# Patient Record
Sex: MALE | Race: WHITE | NOT HISPANIC OR LATINO | URBAN - METROPOLITAN AREA
[De-identification: names, ages, dates, MRNs, and addresses within clinical notes are randomized per-mention and may not be internally consistent; named-entity substitution may affect disease eponyms.]

---

## 2018-09-04 ENCOUNTER — APPOINTMENT (OUTPATIENT)
Dept: URBAN - METROPOLITAN AREA CLINIC 200 | Age: 33
Setting detail: DERMATOLOGY
End: 2018-09-07

## 2018-09-04 DIAGNOSIS — B35.3 TINEA PEDIS: ICD-10-CM

## 2018-09-04 DIAGNOSIS — L57.8 OTHER SKIN CHANGES DUE TO CHRONIC EXPOSURE TO NONIONIZING RADIATION: ICD-10-CM

## 2018-09-04 DIAGNOSIS — Z80.8 FAMILY HISTORY OF MALIGNANT NEOPLASM OF OTHER ORGANS OR SYSTEMS: ICD-10-CM

## 2018-09-04 DIAGNOSIS — L71.8 OTHER ROSACEA: ICD-10-CM

## 2018-09-04 PROBLEM — D23.5 OTHER BENIGN NEOPLASM OF SKIN OF TRUNK: Status: ACTIVE | Noted: 2018-09-04

## 2018-09-04 PROBLEM — L20.84 INTRINSIC (ALLERGIC) ECZEMA: Status: ACTIVE | Noted: 2018-09-04

## 2018-09-04 PROCEDURE — OTHER COUNSELING: OTHER

## 2018-09-04 PROCEDURE — 99203 OFFICE O/P NEW LOW 30 MIN: CPT

## 2018-09-04 PROCEDURE — OTHER PRESCRIPTION: OTHER

## 2018-09-04 PROCEDURE — OTHER RECOMMENDATIONS: OTHER

## 2018-09-04 RX ORDER — METRONIDAZOLE 7.5 MG/G
GEL TOPICAL
Qty: 1 | Refills: 6 | Status: ERX | COMMUNITY
Start: 2018-09-04

## 2018-09-04 RX ORDER — DOXYCYCLINE HYCLATE 20 MG/1
TABLET, FILM COATED ORAL
Qty: 60 | Refills: 5 | Status: ERX | COMMUNITY
Start: 2018-09-04

## 2018-09-04 ASSESSMENT — LOCATION DETAILED DESCRIPTION DERM
LOCATION DETAILED: LEFT LATERAL PLANTAR HEEL
LOCATION DETAILED: LEFT INFERIOR ANTERIOR NECK
LOCATION DETAILED: LEFT MEDIAL SUPERIOR CHEST
LOCATION DETAILED: RIGHT INFERIOR CENTRAL MALAR CHEEK
LOCATION DETAILED: RIGHT MEDIAL PLANTAR MIDFOOT
LOCATION DETAILED: RIGHT PLANTAR FOREFOOT OVERLYING 1ST METATARSAL
LOCATION DETAILED: LEFT INFERIOR CENTRAL MALAR CHEEK

## 2018-09-04 ASSESSMENT — LOCATION ZONE DERM
LOCATION ZONE: FACE
LOCATION ZONE: FEET
LOCATION ZONE: NECK
LOCATION ZONE: TRUNK

## 2018-09-04 ASSESSMENT — LOCATION SIMPLE DESCRIPTION DERM
LOCATION SIMPLE: RIGHT CHEEK
LOCATION SIMPLE: RIGHT PLANTAR SURFACE
LOCATION SIMPLE: LEFT PLANTAR SURFACE
LOCATION SIMPLE: LEFT ANTERIOR NECK
LOCATION SIMPLE: LEFT CHEEK
LOCATION SIMPLE: CHEST

## 2018-09-04 NOTE — PROCEDURE: RECOMMENDATIONS
Recommendations (Free Text): OTC anti fungus medication
Detail Level: Simple
Recommendation Preamble: The following recommendations were made during the visit:

## 2019-01-23 ENCOUNTER — OFFICE VISIT (OUTPATIENT)
Dept: FAMILY MEDICINE | Facility: CLINIC | Age: 34
End: 2019-01-23
Payer: COMMERCIAL

## 2019-01-23 VITALS
SYSTOLIC BLOOD PRESSURE: 112 MMHG | WEIGHT: 202 LBS | HEIGHT: 71 IN | HEART RATE: 60 BPM | DIASTOLIC BLOOD PRESSURE: 70 MMHG | RESPIRATION RATE: 12 BRPM | TEMPERATURE: 97.8 F | BODY MASS INDEX: 28.28 KG/M2

## 2019-01-23 DIAGNOSIS — Z00.00 ENCOUNTER FOR GENERAL ADULT MEDICAL EXAMINATION WITHOUT ABNORMAL FINDINGS: Primary | ICD-10-CM

## 2019-01-23 DIAGNOSIS — E78.00 PURE HYPERCHOLESTEROLEMIA: ICD-10-CM

## 2019-01-23 LAB
BILIRUBIN, POC: NEGATIVE
BLOOD URINE, POC: NEGATIVE
GLUCOSE URINE, POC: NEGATIVE
KETONES, POC: NEGATIVE
LEUKOCYTE EST, POC: NEGATIVE
NITRITE, POC: NEGATIVE
PH, POC: 6
PROTEIN, POC: NEGATIVE
SPECIFIC GRAVITY, POC: 1.03
UROBILINOGEN, POC: 4

## 2019-01-23 PROCEDURE — 90686 IIV4 VACC NO PRSV 0.5 ML IM: CPT | Performed by: FAMILY MEDICINE

## 2019-01-23 PROCEDURE — 99395 PREV VISIT EST AGE 18-39: CPT | Mod: 25 | Performed by: FAMILY MEDICINE

## 2019-01-23 PROCEDURE — 81003 URINALYSIS AUTO W/O SCOPE: CPT | Performed by: FAMILY MEDICINE

## 2019-01-23 PROCEDURE — 90471 IMMUNIZATION ADMIN: CPT | Performed by: FAMILY MEDICINE

## 2019-01-23 ASSESSMENT — ENCOUNTER SYMPTOMS
DIZZINESS: 0
NAUSEA: 0
WEAKNESS: 0
SHORTNESS OF BREATH: 0
VOICE CHANGE: 0
ADENOPATHY: 0
BRUISES/BLEEDS EASILY: 0
VOMITING: 0
CONSTITUTIONAL NEGATIVE: 1
COUGH: 0
TREMORS: 0
BACK PAIN: 0
FREQUENCY: 0
DIARRHEA: 0
TROUBLE SWALLOWING: 0
ABDOMINAL PAIN: 0
CHEST TIGHTNESS: 0
PALPITATIONS: 0
CONFUSION: 0
CONSTIPATION: 0
ARTHRALGIAS: 0
SLEEP DISTURBANCE: 0
HEADACHES: 0
NECK PAIN: 0
DYSURIA: 0

## 2019-01-23 NOTE — PATIENT INSTRUCTIONS
Problem List Items Addressed This Visit        Other    Pure hypercholesterolemia     Cholesterol/Hyperlipidemia:  1. Patient agrees to follow a low fat diet.  2. Patient will engage in a regular exercise, if physically able to exercise.  3. Patient will take medications for his cholesterol, when they are prescribed.           Other Visit Diagnoses     Encounter for general adult medical examination without abnormal findings    -  Primary    Relevant Orders    CBC    Comprehensive metabolic panel    Lipid panel    POCT urinalysis, automated, w/o scope    BMI 28.0-28.9,adult        Do not gain any weight.  And after March hopefully we can lose some more weight.      Patient had a hooping cough vaccination in 2009.  The CDC recommends all adults only need one hooping cough vaccination.  Pregnant women will get in cough vaccinations prior to their delivery.  Patient's wife is currently pregnant by the CDC recommendation he does not need another 14 cough vaccination.  However, if there is some concern by his spouse.  We will give him another vaccination.

## 2019-01-23 NOTE — ASSESSMENT & PLAN NOTE
Cholesterol/Hyperlipidemia:  1. Patient agrees to follow a low fat diet.  2. Patient will engage in a regular exercise, if physically able to exercise.  3. Patient will take medications for his cholesterol, when they are prescribed.

## 2019-01-23 NOTE — PROGRESS NOTES
"   Patient ID: Nixon Moscoso is a 33 y.o. male.        Subjective     Patient is a 33-year-old male who is here for his annual physical examination.  He was last seen about 2 years ago.  Patient has no new concerns.          The following have been reviewed and updated as appropriate in this visit:  Tobacco  Allergies  Problems  Fam Hx       Patient Active Problem List   Diagnosis   • Pure hypercholesterolemia   • Allergic rhinitis     No current outpatient prescriptions on file.    Allergies   Allergen Reactions   • No Known Allergies      Review of Systems   Constitutional: Negative.    HENT: Negative for ear pain, hearing loss, nosebleeds, trouble swallowing and voice change.    Eyes: Negative for visual disturbance.   Respiratory: Negative for cough, chest tightness and shortness of breath.    Cardiovascular: Negative for chest pain, palpitations and leg swelling.   Gastrointestinal: Negative for abdominal pain, constipation, diarrhea, nausea and vomiting.   Endocrine: Negative for cold intolerance and heat intolerance.   Genitourinary: Negative for decreased urine volume, discharge, dysuria, frequency and testicular pain.   Musculoskeletal: Negative for arthralgias, back pain and neck pain.   Skin: Negative for rash.   Allergic/Immunologic: Negative for food allergies.   Neurological: Negative for dizziness, tremors, syncope, weakness and headaches.   Hematological: Negative for adenopathy. Does not bruise/bleed easily.   Psychiatric/Behavioral: Negative for behavioral problems, confusion, sleep disturbance and suicidal ideas.         Objective     Vitals:    01/23/19 0802 01/23/19 0820   BP: 108/72 112/70   Pulse: 60    Resp: 12    Temp: 36.6 °C (97.8 °F)    Weight: 91.6 kg (202 lb)    Height: 1.803 m (5' 11\")      Physical Exam   Constitutional: He is oriented to person, place, and time. He appears well-developed and well-nourished.   HENT:   Head: Normocephalic.   Right Ear: External ear normal. "   Left Ear: External ear normal.   Nose: Nose normal.   Mouth/Throat: Oropharynx is clear and moist.   Eyes: Conjunctivae and EOM are normal. Pupils are equal, round, and reactive to light. No scleral icterus.   Neck: Normal range of motion. Neck supple. No thyromegaly present.   Cardiovascular: Normal rate, regular rhythm, normal heart sounds and intact distal pulses.    Pulmonary/Chest: Effort normal and breath sounds normal. No respiratory distress. He has no wheezes. He has no rales. He exhibits no tenderness.   Abdominal: Soft. Bowel sounds are normal. He exhibits no distension and no mass. There is no tenderness. There is no rebound and no guarding. No hernia.   Genitourinary: Penis normal.   Musculoskeletal: He exhibits no edema, tenderness or deformity.   Lymphadenopathy:     He has no cervical adenopathy.   Neurological: He is alert and oriented to person, place, and time. He displays normal reflexes. No cranial nerve deficit. Coordination normal.   Skin: Skin is warm. Capillary refill takes less than 2 seconds. No rash noted. No pallor.   Psychiatric: He has a normal mood and affect. His behavior is normal. Judgment normal.   Nursing note and vitals reviewed.      Assessment/Plan       Problem List Items Addressed This Visit        Other    Pure hypercholesterolemia     Cholesterol/Hyperlipidemia:  1. Patient agrees to follow a low fat diet.  2. Patient will engage in a regular exercise, if physically able to exercise.  3. Patient will take medications for his cholesterol, when they are prescribed.           Other Visit Diagnoses     Encounter for general adult medical examination without abnormal findings    -  Primary    Relevant Orders    CBC    Comprehensive metabolic panel    Lipid panel    POCT urinalysis, automated, w/o scope    BMI 28.0-28.9,adult        Do not gain any weight.  And after March hopefully we can lose some more weight.      Patient had a hooping cough vaccination in 2009.  The CDC  recommends all adults only need one hooping cough vaccination.  Pregnant women will get in cough vaccinations prior to their delivery.  Patient's wife is currently pregnant by the CDC recommendation he does not need another 14 cough vaccination.  However, if there is some concern by his spouse.  We will give him another vaccination.

## 2019-01-24 LAB
ALBUMIN SERPL-MCNC: 4.8 G/DL (ref 3.5–5.5)
ALBUMIN/GLOB SERPL: 1.7 {RATIO} (ref 1.2–2.2)
ALP SERPL-CCNC: 60 IU/L (ref 39–117)
ALT SERPL-CCNC: 18 IU/L (ref 0–44)
AST SERPL-CCNC: 26 IU/L (ref 0–40)
BILIRUB SERPL-MCNC: 0.6 MG/DL (ref 0–1.2)
BUN SERPL-MCNC: 17 MG/DL (ref 6–20)
BUN/CREAT SERPL: 16 (ref 9–20)
CALCIUM SERPL-MCNC: 9.5 MG/DL (ref 8.7–10.2)
CHLORIDE SERPL-SCNC: 97 MMOL/L (ref 96–106)
CHOLEST SERPL-MCNC: 189 MG/DL (ref 100–199)
CO2 SERPL-SCNC: 27 MMOL/L (ref 20–29)
CREAT SERPL-MCNC: 1.05 MG/DL (ref 0.76–1.27)
ERYTHROCYTE [DISTWIDTH] IN BLOOD BY AUTOMATED COUNT: 13.8 % (ref 12.3–15.4)
GLOBULIN SER CALC-MCNC: 2.8 G/DL (ref 1.5–4.5)
GLUCOSE SERPL-MCNC: 96 MG/DL (ref 65–99)
HCT VFR BLD AUTO: 44.6 % (ref 37.5–51)
HDLC SERPL-MCNC: 55 MG/DL
HGB BLD-MCNC: 15.2 G/DL (ref 13–17.7)
LAB CORP EGFR IF AFRICN AM: 109 ML/MIN/1.73
LAB CORP EGFR IF NONAFRICN AM: 94 ML/MIN/1.73
LDLC SERPL CALC-MCNC: 111 MG/DL (ref 0–99)
MCH RBC QN AUTO: 28.4 PG (ref 26.6–33)
MCHC RBC AUTO-ENTMCNC: 34.1 G/DL (ref 31.5–35.7)
MCV RBC AUTO: 83 FL (ref 79–97)
PLATELET # BLD AUTO: 253 X10E3/UL (ref 150–379)
POTASSIUM SERPL-SCNC: 4.5 MMOL/L (ref 3.5–5.2)
PROT SERPL-MCNC: 7.6 G/DL (ref 6–8.5)
RBC # BLD AUTO: 5.36 X10E6/UL (ref 4.14–5.8)
SODIUM SERPL-SCNC: 139 MMOL/L (ref 134–144)
TRIGL SERPL-MCNC: 117 MG/DL (ref 0–149)
VLDLC SERPL CALC-MCNC: 23 MG/DL (ref 5–40)
WBC # BLD AUTO: 7.6 X10E3/UL (ref 3.4–10.8)

## 2019-01-26 ENCOUNTER — TELEPHONE (OUTPATIENT)
Dept: FAMILY MEDICINE | Facility: CLINIC | Age: 34
End: 2019-01-26

## 2019-01-26 NOTE — TELEPHONE ENCOUNTER
Nixon,  Enclosed a copy of your most recent lab work.  The only abnormality was a mild elevation in your LDL/bad cholesterol.  Optimal LDL is under 100.  I encourage you to eat healthy low-fat/Mediterranean diet, exercise and maintain an ideal body weight.  Recommend checking in 1 year.

## 2019-01-26 NOTE — LETTER
January 26, 2019     Nixon Moscoso  [address] [place] [id number]      Dear Nixon PONCE:    Below are the results from your recent visit:    Resulted Orders   CBC   Result Value Ref Range    WBC 7.6 3.4 - 10.8 x10E3/uL    RBC 5.36 4.14 - 5.80 x10E6/uL    Hemoglobin 15.2 13.0 - 17.7 g/dL    Hematocrit 44.6 37.5 - 51.0 %    MCV 83 79 - 97 fL    MCH 28.4 26.6 - 33.0 pg    MCHC 34.1 31.5 - 35.7 g/dL    RDW 13.8 12.3 - 15.4 %    Platelets 253 150 - 379 x10E3/uL    Narrative    Performed at:  01 - [facility]  [id number]  : Araceli B Reyes MD, Phone:  [phone]   Comprehensive metabolic panel   Result Value Ref Range    Glucose, Serum 96 65 - 99 mg/dL    BUN 17 6 - 20 mg/dL    Creatinine, Serum 1.05 0.76 - 1.27 mg/dL    eGFR If NonAfricn Am 94 >59 mL/min/1.73    eGFR If Africn Am 109 >59 mL/min/1.73    BUN/Creatinine Ratio 16 9 - 20    Sodium, Serum 139 134 - 144 mmol/L    Potassium, Serum 4.5 3.5 - 5.2 mmol/L    Chloride, Serum 97 96 - 106 mmol/L    Carbon Dioxide, Total 27 20 - 29 mmol/L    Calcium, Serum 9.5 8.7 - 10.2 mg/dL    Protein, Total, Serum 7.6 6.0 - 8.5 g/dL    Albumin, Serum 4.8 3.5 - 5.5 g/dL    Globulin, Total 2.8 1.5 - 4.5 g/dL    A/G Ratio 1.7 1.2 - 2.2    Bilirubin, Total 0.6 0.0 - 1.2 mg/dL    Alkaline Phosphatase, S 60 39 - 117 IU/L    AST (SGOT) 26 0 - 40 IU/L    ALT (SGPT) 18 0 - 44 IU/L    Narrative    Performed at:  01 - [facility]  [id number]  : Araceli B Reyes MD, Phone:  [phone]   Lipid panel   Result Value Ref Range    Cholesterol, Total 189 100 - 199 mg/dL    Triglycerides 117 0 - 149 mg/dL    HDL Cholesterol 55 >39 mg/dL    VLDL Cholesterol Cecilio 23 5 - 40 mg/dL    LDL Cholesterol Calc 111 (H) 0 - 99 mg/dL    Narrative    Performed at:  01 - [address]  [id number]  : Araceli B Reyes MD, Phone:  [phone]   POCT urinalysis, automated, w/o scope    Result Value Ref Range    Leukocytes, UA Negative     Nitrite, UA Negative     Urobilinogen, UA 4.0     Protein, UA Negative     pH, UA 6.0     Blood, UA Negative     Spec Grav, UA 1.030     Ketones, UA Negative     Bilirubin, UA Negative     Glucose, UA Negative        The test results show abnormal values which need to be addressed.  In particular, the results for LDL/bad cholesterol are significant.    Nixon,  Enclosed a copy of your most recent lab work.  The only abnormality was a mild elevation in your LDL/bad cholesterol.  Optimal LDL is under 100.  I encourage you to eat healthy low-fat/Mediterranean diet, exercise and maintain an ideal body weight.  Recommend checking in 1 year.    If you have any questions or concerns, please don't hesitate to call.    Sincerely,        Merlin Magana MD

## 2020-01-13 ENCOUNTER — APPOINTMENT (OUTPATIENT)
Dept: URBAN - METROPOLITAN AREA CLINIC 200 | Age: 35
Setting detail: DERMATOLOGY
End: 2020-01-27

## 2020-01-13 ENCOUNTER — RX ONLY (RX ONLY)
Age: 35
End: 2020-01-13

## 2020-01-13 DIAGNOSIS — Z80.8 FAMILY HISTORY OF MALIGNANT NEOPLASM OF OTHER ORGANS OR SYSTEMS: ICD-10-CM

## 2020-01-13 DIAGNOSIS — L71.8 OTHER ROSACEA: ICD-10-CM

## 2020-01-13 DIAGNOSIS — L57.8 OTHER SKIN CHANGES DUE TO CHRONIC EXPOSURE TO NONIONIZING RADIATION: ICD-10-CM

## 2020-01-13 DIAGNOSIS — Z12.83 ENCOUNTER FOR SCREENING FOR MALIGNANT NEOPLASM OF SKIN: ICD-10-CM

## 2020-01-13 PROCEDURE — OTHER COUNSELING: OTHER

## 2020-01-13 PROCEDURE — OTHER MIPS QUALITY: OTHER

## 2020-01-13 PROCEDURE — 99213 OFFICE O/P EST LOW 20 MIN: CPT

## 2020-01-13 PROCEDURE — OTHER REASSURANCE: OTHER

## 2020-01-13 PROCEDURE — OTHER PRESCRIPTION: OTHER

## 2020-01-13 PROCEDURE — OTHER PRESCRIPTION MEDICATION MANAGEMENT: OTHER

## 2020-01-13 RX ORDER — AZELAIC ACID 0.15 G/G
GEL TOPICAL
Qty: 1 | Refills: 6 | Status: CANCELLED
Stop reason: DRUGHIGH

## 2020-01-13 ASSESSMENT — LOCATION DETAILED DESCRIPTION DERM
LOCATION DETAILED: LEFT CENTRAL MALAR CHEEK
LOCATION DETAILED: LEFT MEDIAL SUPERIOR CHEST
LOCATION DETAILED: RIGHT SUPERIOR MEDIAL UPPER BACK
LOCATION DETAILED: RIGHT LATERAL MALAR CHEEK
LOCATION DETAILED: LEFT INFERIOR ANTERIOR NECK

## 2020-01-13 ASSESSMENT — LOCATION SIMPLE DESCRIPTION DERM
LOCATION SIMPLE: RIGHT UPPER BACK
LOCATION SIMPLE: CHEST
LOCATION SIMPLE: LEFT ANTERIOR NECK
LOCATION SIMPLE: LEFT CHEEK
LOCATION SIMPLE: RIGHT CHEEK

## 2020-01-13 ASSESSMENT — LOCATION ZONE DERM
LOCATION ZONE: TRUNK
LOCATION ZONE: NECK
LOCATION ZONE: FACE

## 2020-01-13 NOTE — PROCEDURE: PRESCRIPTION MEDICATION MANAGEMENT
Otc Regimen: Azelaic acid 10%
Render In Strict Bullet Format?: No
Plan: T/C soolantra if azleic acid has not improved rosacea, told patient to call if he wanted to try soolantra  and we would send it in for him.
Detail Level: Detailed

## 2020-03-25 ENCOUNTER — TELEMEDICINE (OUTPATIENT)
Dept: FAMILY MEDICINE | Facility: CLINIC | Age: 35
End: 2020-03-25
Payer: COMMERCIAL

## 2020-03-25 DIAGNOSIS — R22.0 TONGUE SWELLING: Primary | ICD-10-CM

## 2020-03-25 PROCEDURE — 99213 OFFICE O/P EST LOW 20 MIN: CPT | Mod: 95 | Performed by: FAMILY MEDICINE

## 2020-03-25 NOTE — PROGRESS NOTES
Request for Consent:  You and I are about to have a telemedicine check-in or visit. This is allowed because you are already my patient, and you have requested it.  This telemedicine visit will be billed to your health insurance or you, if you are self-insured.  You understand you will be responsible for any copayments or coinsurances that apply to your telemedicine visit.  Before starting our telemedicine visit, I am required to get your consent for this virtual check-in or visit by telemedicine. Do you consent?      Patient Response to Request for Consent: Yes    The following have been reviewed and updated as appropriate in this visit:  Tobacco  Allergies  Meds  Problems  Med Hx  Surg Hx  Fam Hx  Soc Hx          Visit Documentation:  Started yesterday with tongue felt swollen, fatter, happened around dinner last pm.  Ate chicken quesadilla for dinner, had a coke zero with dinner.  Did not take Benadryl, feels a little sore and swollen under skin, no rash, no cough, no fever, no SOB, had frozen meatballs and pasta sauce at lunch.  Pt says tongue looks ok, not coated, no spots.  Pt says overall the tongue feels better, not 100%.    Pt looked in the mirror and said no spots on tongue but did think there may be a white coating.  Would not have noticed it though, except that I specifically asked about it.  At this point pt said he was much less concerned, he will take Benadryl as needed, keep trackof diet and see if there are any triggers/correlation.      Time Spent in Medical Discussion During This Encounter:  11 minutes   · Serial EGD on 5/9 with no acute issues  · Monitor for bleeding

## 2021-01-27 ENCOUNTER — OFFICE VISIT (OUTPATIENT)
Dept: PRIMARY CARE | Facility: CLINIC | Age: 36
End: 2021-01-27
Payer: COMMERCIAL

## 2021-01-27 VITALS
TEMPERATURE: 97.6 F | HEIGHT: 71 IN | BODY MASS INDEX: 27.58 KG/M2 | RESPIRATION RATE: 12 BRPM | HEART RATE: 95 BPM | WEIGHT: 197 LBS | DIASTOLIC BLOOD PRESSURE: 72 MMHG | SYSTOLIC BLOOD PRESSURE: 120 MMHG

## 2021-01-27 DIAGNOSIS — E78.00 PURE HYPERCHOLESTEROLEMIA: ICD-10-CM

## 2021-01-27 DIAGNOSIS — Z23 NEEDS FLU SHOT: ICD-10-CM

## 2021-01-27 DIAGNOSIS — Z11.59 NEED FOR HEPATITIS C SCREENING TEST: ICD-10-CM

## 2021-01-27 DIAGNOSIS — Z00.00 ENCOUNTER FOR PREVENTATIVE ADULT HEALTH CARE EXAMINATION: Primary | ICD-10-CM

## 2021-01-27 PROBLEM — G25.0 BENIGN FAMILIAL TREMOR: Status: ACTIVE | Noted: 2021-01-27

## 2021-01-27 PROCEDURE — 90686 IIV4 VACC NO PRSV 0.5 ML IM: CPT | Performed by: FAMILY MEDICINE

## 2021-01-27 PROCEDURE — 99395 PREV VISIT EST AGE 18-39: CPT | Mod: 25 | Performed by: FAMILY MEDICINE

## 2021-01-27 PROCEDURE — 90471 IMMUNIZATION ADMIN: CPT | Performed by: FAMILY MEDICINE

## 2021-01-27 ASSESSMENT — ENCOUNTER SYMPTOMS
GASTROINTESTINAL NEGATIVE: 1
TREMORS: 1
CARDIOVASCULAR NEGATIVE: 1
CONSTITUTIONAL NEGATIVE: 1
EYES NEGATIVE: 1
HEMATOLOGIC/LYMPHATIC NEGATIVE: 1
ENDOCRINE NEGATIVE: 1
ALLERGIC/IMMUNOLOGIC NEGATIVE: 1
RESPIRATORY NEGATIVE: 1
MUSCULOSKELETAL NEGATIVE: 1

## 2021-01-27 NOTE — PROGRESS NOTES
"Subjective      Patient ID: Nixon Moscoso is a 35 y.o. male     HPI: He comes in today for an annual preventive exam.  Overall he reports feeling pretty well, he leads an active and healthy lifestyle, he and his family like to exercise regularly, he tries to watch his diet and does maintain a healthy weight.  He is open to receiving the flu shot today, and does plan on getting the Covid vaccine when available for his age group.  He does report having a mild tremor, this is not a new issue and he tells me its been ongoing for at least the past 3 or 4 years.  His father does have familial tremor, and he has noticed that there are some things that can worsen it such as anxiety or if he has increased amounts of caffeine.  He tells me that it does not affect his daily activities including eating, writing or fine dexterity tasks    The following have been reviewed and updated as appropriate in this visit:  Allergies  Meds  Problems       Review of Systems   Constitutional: Negative.    HENT: Negative.    Eyes: Negative.    Respiratory: Negative.    Cardiovascular: Negative.    Gastrointestinal: Negative.    Endocrine: Negative.    Genitourinary: Negative.    Musculoskeletal: Negative.    Skin: Negative.    Allergic/Immunologic: Negative.    Neurological: Positive for tremors.   Hematological: Negative.    All other systems reviewed and are negative.    Vitals:    01/27/21 0834   BP: 120/72   Pulse: 95   Resp: 12   Temp: 36.4 °C (97.6 °F)   Weight: 89.4 kg (197 lb)   Height: 1.803 m (5' 11\")      No current outpatient medications on file.     No current facility-administered medications for this visit.       Social History     Tobacco Use   • Smoking status: Never Smoker   • Smokeless tobacco: Never Used   Substance Use Topics   • Alcohol use: Yes     Alcohol/week: 3.0 standard drinks     Types: 3 Cans of beer per week     Comment: 4-5 weekly   • Drug use: No      Family History   Problem Relation Age of Onset   • " Melanoma Biological Mother    • Hyperlipidemia Biological Father         Past Medical History:   Diagnosis Date   • Acne     Status post Accutane treatment   • Allergic rhinitis    • Exercise induced bronchospasm    • Lipid disorder         Objective     Physical Exam  Constitutional:       Appearance: Normal appearance. He is normal weight.   HENT:      Right Ear: Tympanic membrane, ear canal and external ear normal. There is no impacted cerumen.      Left Ear: Tympanic membrane, ear canal and external ear normal. There is no impacted cerumen.   Eyes:      Conjunctiva/sclera: Conjunctivae normal.   Neck:      Musculoskeletal: Neck supple.   Cardiovascular:      Rate and Rhythm: Normal rate and regular rhythm.      Heart sounds: Normal heart sounds.   Pulmonary:      Effort: Pulmonary effort is normal.      Breath sounds: Normal breath sounds.   Abdominal:      Palpations: Abdomen is soft.   Skin:     General: Skin is warm.   Neurological:      Mental Status: He is alert and oriented to person, place, and time.      Comments: Fine hand tremors noted with outstretched arms   Psychiatric:         Mood and Affect: Mood normal.         Problem List Items Addressed This Visit        Endocrine/Metabolic    Pure hypercholesterolemia    Relevant Orders    Lipid panel    Comprehensive metabolic panel      Other Visit Diagnoses     Encounter for preventative adult health care examination    -  Primary    Relevant Orders    Influenza vaccine quadrivalent preservative free 6 mon and older IM (FluLaval)    Need for hepatitis C screening test        Relevant Orders    Hepatitis C antibody    Needs flu shot        Relevant Orders    Influenza vaccine quadrivalent preservative free 6 mon and older IM (FluLaval)          Assessment/Plan     Adult preventive exam: Overall he is doing very well, he exercises, he watches his diet and maintains a healthy weight.  We discussed disease and injury prevention, he will get the flu shot today  and does plan on getting the COVID-19 vaccination done when available.  I will check a lipid panel today as well as well as a hepatitis C antibody.  Overall he is doing very well he does have some fine tremors that are noted, he does not want to do any further work-up although should his symptoms worsen I am more than happy to see him to address that we did briefly discuss how to approach this including some medications as well as possible referral to neurology if needed.  For now we will go ahead and observe this.  I had like to see him back in 1 year for his next annual checkup sooner if needed.    COVID 19 VACCINE INFO: We strongly recommend that you receive the COVID 19 vaccination.  If you wish to receive it through [facility], you must be signed up for [employer].  [facility]oint[facility] only be scheduled through [facility].  If you require assistance to sign up for [employer], please ask any staff member in the office.  Our office does not determine who will receive the vaccination or when the vaccine will be administered.  You can also sign up for the vaccine through your [facility] department and we strongly urge you to register.  Wherever you can get the vaccine earliest, is where you should get the vaccine.

## 2022-01-28 ENCOUNTER — OFFICE VISIT (OUTPATIENT)
Dept: PRIMARY CARE | Facility: CLINIC | Age: 37
End: 2022-01-28
Payer: COMMERCIAL

## 2022-01-28 VITALS
SYSTOLIC BLOOD PRESSURE: 121 MMHG | HEIGHT: 71 IN | DIASTOLIC BLOOD PRESSURE: 82 MMHG | HEART RATE: 78 BPM | TEMPERATURE: 97.3 F | BODY MASS INDEX: 27.48 KG/M2

## 2022-01-28 DIAGNOSIS — Z00.00 ENCOUNTER FOR PREVENTATIVE ADULT HEALTH CARE EXAMINATION: Primary | ICD-10-CM

## 2022-01-28 DIAGNOSIS — E78.00 PURE HYPERCHOLESTEROLEMIA: ICD-10-CM

## 2022-01-28 DIAGNOSIS — G25.0 BENIGN FAMILIAL TREMOR: ICD-10-CM

## 2022-01-28 PROCEDURE — 3008F BODY MASS INDEX DOCD: CPT | Performed by: FAMILY MEDICINE

## 2022-01-28 PROCEDURE — 99395 PREV VISIT EST AGE 18-39: CPT | Performed by: FAMILY MEDICINE

## 2022-01-28 ASSESSMENT — ENCOUNTER SYMPTOMS
PSYCHIATRIC NEGATIVE: 1
HEMATOLOGIC/LYMPHATIC NEGATIVE: 1
ALLERGIC/IMMUNOLOGIC NEGATIVE: 1
CARDIOVASCULAR NEGATIVE: 1
RESPIRATORY NEGATIVE: 1
MUSCULOSKELETAL NEGATIVE: 1
ENDOCRINE NEGATIVE: 1
NEUROLOGICAL NEGATIVE: 1
GASTROINTESTINAL NEGATIVE: 1
EYES NEGATIVE: 1
CONSTITUTIONAL NEGATIVE: 1

## 2022-01-28 NOTE — PROGRESS NOTES
"Subjective      Patient ID: Nixon Moscoso is a 36 y.o. male     HPI:  Here for annual check up.  Doing well overall, staying active, watching diet and maintaining healthy weight.    The following have been reviewed and updated as appropriate in this visit:   Meds       Review of Systems   Constitutional: Negative.    HENT: Negative.    Eyes: Negative.    Respiratory: Negative.    Cardiovascular: Negative.    Gastrointestinal: Negative.    Endocrine: Negative.    Genitourinary: Negative.    Musculoskeletal: Negative.    Skin: Negative.    Allergic/Immunologic: Negative.    Neurological: Negative.    Hematological: Negative.    Psychiatric/Behavioral: Negative.    All other systems reviewed and are negative.    Vitals:    01/28/22 0758   BP: 121/82   BP Location: Left upper arm   Patient Position: Sitting   Pulse: 78   Temp: 36.3 °C (97.3 °F)   Height: 1.803 m (5' 11\")      No current outpatient medications on file.     No current facility-administered medications for this visit.      Social History     Tobacco Use   • Smoking status: Never Smoker   • Smokeless tobacco: Never Used   Substance Use Topics   • Alcohol use: Yes     Alcohol/week: 3.0 standard drinks     Types: 3 Cans of beer per week     Comment: 4-5 weekly   • Drug use: No      Family History   Problem Relation Age of Onset   • Melanoma Biological Mother    • Hyperlipidemia Biological Father         Past Medical History:   Diagnosis Date   • Acne     Status post Accutane treatment   • Allergic rhinitis    • Exercise induced bronchospasm    • Lipid disorder         Objective     Physical Exam  Vitals reviewed.   Constitutional:       Appearance: Normal appearance. He is normal weight.   HENT:      Right Ear: Tympanic membrane, ear canal and external ear normal. There is no impacted cerumen.      Left Ear: Tympanic membrane, ear canal and external ear normal. There is no impacted cerumen.      Nose: Nose normal.      Mouth/Throat:      Mouth: Mucous " membranes are moist.      Pharynx: Oropharynx is clear.   Eyes:      Conjunctiva/sclera: Conjunctivae normal.   Cardiovascular:      Rate and Rhythm: Normal rate and regular rhythm.      Heart sounds: Normal heart sounds.   Pulmonary:      Effort: Pulmonary effort is normal.      Breath sounds: Normal breath sounds.   Abdominal:      Palpations: Abdomen is soft.   Musculoskeletal:      Cervical back: Neck supple.   Lymphadenopathy:      Cervical: No cervical adenopathy.   Skin:     General: Skin is warm.   Neurological:      Mental Status: He is alert and oriented to person, place, and time.   Psychiatric:         Mood and Affect: Mood normal.         Problem List Items Addressed This Visit        Nervous    Benign familial tremor       Endocrine/Metabolic    Pure hypercholesterolemia    Relevant Orders    Lipid panel      Other Visit Diagnoses     Encounter for preventative adult health care examination    -  Primary    Relevant Orders    HIV 1,2 AB P24 AG    Hepatitis C antibody    Comprehensive metabolic panel    Lipid panel          Assessment/Plan     Overall there are no significant concerns today.  The patient pursues a healthy lifestyle, exercises frequently, watches diet carefully, and maintains a healthy weight.  We discussed disease and injury prevention including all pertinent screenings, vaccinations, and healthy lifestyle choices.  He is up to date with vaccinations, and today I will order blood work.  I will review all laboratory studies when available, and I plan on seeing the patient back in 1 year for the next annual exam, sooner if needed.

## 2023-01-30 ENCOUNTER — OFFICE VISIT (OUTPATIENT)
Dept: PRIMARY CARE | Facility: CLINIC | Age: 38
End: 2023-01-30
Payer: COMMERCIAL

## 2023-01-30 VITALS
DIASTOLIC BLOOD PRESSURE: 70 MMHG | HEIGHT: 71 IN | HEART RATE: 72 BPM | OXYGEN SATURATION: 96 % | WEIGHT: 211 LBS | RESPIRATION RATE: 16 BRPM | BODY MASS INDEX: 29.54 KG/M2 | SYSTOLIC BLOOD PRESSURE: 108 MMHG | TEMPERATURE: 98.3 F

## 2023-01-30 DIAGNOSIS — Z00.00 ENCOUNTER FOR PREVENTATIVE ADULT HEALTH CARE EXAMINATION: Primary | ICD-10-CM

## 2023-01-30 DIAGNOSIS — E78.00 PURE HYPERCHOLESTEROLEMIA: ICD-10-CM

## 2023-01-30 PROCEDURE — 3008F BODY MASS INDEX DOCD: CPT | Performed by: FAMILY MEDICINE

## 2023-01-30 PROCEDURE — 99395 PREV VISIT EST AGE 18-39: CPT | Performed by: FAMILY MEDICINE

## 2023-01-30 ASSESSMENT — ENCOUNTER SYMPTOMS
CARDIOVASCULAR NEGATIVE: 1
RESPIRATORY NEGATIVE: 1
CONSTITUTIONAL NEGATIVE: 1
MUSCULOSKELETAL NEGATIVE: 1
ENDOCRINE NEGATIVE: 1
GASTROINTESTINAL NEGATIVE: 1
ALLERGIC/IMMUNOLOGIC NEGATIVE: 1
EYES NEGATIVE: 1
NEUROLOGICAL NEGATIVE: 1
PSYCHIATRIC NEGATIVE: 1
HEMATOLOGIC/LYMPHATIC NEGATIVE: 1

## 2023-01-30 ASSESSMENT — PAIN SCALES - GENERAL: PAINLEVEL: 0-NO PAIN

## 2023-01-30 NOTE — ASSESSMENT & PLAN NOTE
Overall there are no significant concerns today.  The patient pursues a healthy lifestyle, exercises, watches diet, and does plan on shedding some weight.  We discussed disease and injury prevention including all pertinent screenings, vaccinations, and healthy lifestyle choices.  Testing will be ordered including blood work and I will review the test results and if there are any abnormal findings we will discuss in detail.  I plan on seeing the patient back in a year for the next annual exam, sooner if necessary.

## 2023-01-30 NOTE — PROGRESS NOTES
"Subjective      Patient ID: Nixon Moscoso is a 37 y.o. male     HPI: Annual exam.  Overall he is doing well.    The following have been reviewed and updated as appropriate in this visit:   Tobacco  Allergies  Meds  Problems  Med Hx  Surg Hx  Fam Hx       Review of Systems   Constitutional: Negative.    HENT: Negative.    Eyes: Negative.    Respiratory: Negative.    Cardiovascular: Negative.    Gastrointestinal: Negative.    Endocrine: Negative.    Genitourinary: Negative.    Musculoskeletal: Negative.    Skin: Negative.    Allergic/Immunologic: Negative.    Neurological: Negative.    Hematological: Negative.    Psychiatric/Behavioral: Negative.    All other systems reviewed and are negative.    Vitals:    01/30/23 0736   BP: 108/70   BP Location: Left upper arm   Patient Position: Sitting   Pulse: 72   Resp: 16   Temp: 36.8 °C (98.3 °F)   TempSrc: Oral   SpO2: 96%   Weight: 95.7 kg (211 lb)   Height: 1.803 m (5' 11\")      No current outpatient medications on file.     No current facility-administered medications for this visit.      Social History     Tobacco Use   • Smoking status: Never   • Smokeless tobacco: Never   Substance Use Topics   • Alcohol use: Yes     Alcohol/week: 3.0 standard drinks     Types: 3 Cans of beer per week     Comment: 4-5 weekly   • Drug use: No      Family History   Problem Relation Age of Onset   • Melanoma Biological Mother    • Hyperlipidemia Biological Father         Past Medical History:   Diagnosis Date   • Acne     Status post Accutane treatment   • Allergic rhinitis    • Exercise induced bronchospasm    • Lipid disorder         Objective     Physical Exam  Vitals reviewed.   Constitutional:       Appearance: Normal appearance. He is normal weight.   HENT:      Right Ear: Tympanic membrane, ear canal and external ear normal. There is no impacted cerumen.      Left Ear: Tympanic membrane, ear canal and external ear normal. There is no impacted cerumen.      Nose: Nose " normal.      Mouth/Throat:      Mouth: Mucous membranes are moist.      Pharynx: Oropharynx is clear.   Eyes:      Conjunctiva/sclera: Conjunctivae normal.   Cardiovascular:      Rate and Rhythm: Normal rate and regular rhythm.      Heart sounds: Normal heart sounds.   Pulmonary:      Effort: Pulmonary effort is normal.      Breath sounds: Normal breath sounds.   Abdominal:      Palpations: Abdomen is soft.   Musculoskeletal:      Cervical back: Neck supple.   Skin:     General: Skin is warm.   Neurological:      Mental Status: He is alert and oriented to person, place, and time.   Psychiatric:         Mood and Affect: Mood normal.         Problem List Items Addressed This Visit        Other    Pure hypercholesterolemia    Relevant Orders    Lipid panel    Encounter for preventative adult health care examination - Primary     Overall there are no significant concerns today.  The patient pursues a healthy lifestyle, exercises, watches diet, and does plan on shedding some weight.  We discussed disease and injury prevention including all pertinent screenings, vaccinations, and healthy lifestyle choices.  Testing will be ordered including blood work and I will review the test results and if there are any abnormal findings we will discuss in detail.  I plan on seeing the patient back in a year for the next annual exam, sooner if necessary.          Relevant Orders    Comprehensive metabolic panel    Lipid panel       Assessment/Plan

## 2023-03-15 LAB
ALBUMIN SERPL-MCNC: 4.6 G/DL (ref 4–5)
ALBUMIN/GLOB SERPL: 1.5 {RATIO} (ref 1.2–2.2)
ALP SERPL-CCNC: 60 IU/L (ref 44–121)
ALT SERPL-CCNC: 19 IU/L (ref 0–44)
AST SERPL-CCNC: 24 IU/L (ref 0–40)
BILIRUB SERPL-MCNC: 0.9 MG/DL (ref 0–1.2)
BUN SERPL-MCNC: 20 MG/DL (ref 6–20)
BUN/CREAT SERPL: 19 (ref 9–20)
CALCIUM SERPL-MCNC: 9.8 MG/DL (ref 8.7–10.2)
CHLORIDE SERPL-SCNC: 99 MMOL/L (ref 96–106)
CHOLEST SERPL-MCNC: 214 MG/DL (ref 100–199)
CO2 SERPL-SCNC: 25 MMOL/L (ref 20–29)
CREAT SERPL-MCNC: 1.07 MG/DL (ref 0.76–1.27)
EGFRCR SERPLBLD CKD-EPI 2021: 93 ML/MIN/1.73
GLOBULIN SER CALC-MCNC: 3 G/DL (ref 1.5–4.5)
GLUCOSE SERPL-MCNC: 95 MG/DL (ref 70–99)
HDLC SERPL-MCNC: 54 MG/DL
LDLC SERPL CALC-MCNC: 143 MG/DL (ref 0–99)
POTASSIUM SERPL-SCNC: 4.4 MMOL/L (ref 3.5–5.2)
PROT SERPL-MCNC: 7.6 G/DL (ref 6–8.5)
SODIUM SERPL-SCNC: 138 MMOL/L (ref 134–144)
TRIGL SERPL-MCNC: 98 MG/DL (ref 0–149)
VLDLC SERPL CALC-MCNC: 17 MG/DL (ref 5–40)

## 2024-03-14 ENCOUNTER — TELEPHONE (OUTPATIENT)
Dept: PRIMARY CARE | Facility: CLINIC | Age: 39
End: 2024-03-14
Payer: COMMERCIAL

## 2024-03-14 NOTE — TELEPHONE ENCOUNTER
Test Order Request   Patient PCP: Jermaine Olmstead MD  Next Office Visit: 3/25/2024  Preferred Lab: [id number]  [address] [facility] [id number]  Tests Requested: Annual Labs  , MyChart, or US Mail?: MyChart    The practice will reach out to discuss your request within 2 business days.

## 2024-03-25 ENCOUNTER — OFFICE VISIT (OUTPATIENT)
Dept: PRIMARY CARE | Facility: CLINIC | Age: 39
End: 2024-03-25
Payer: COMMERCIAL

## 2024-03-25 VITALS
OXYGEN SATURATION: 99 % | WEIGHT: 204 LBS | DIASTOLIC BLOOD PRESSURE: 78 MMHG | HEIGHT: 71 IN | HEART RATE: 78 BPM | BODY MASS INDEX: 28.56 KG/M2 | SYSTOLIC BLOOD PRESSURE: 112 MMHG | TEMPERATURE: 97.8 F

## 2024-03-25 DIAGNOSIS — Z00.00 ENCOUNTER FOR PREVENTATIVE ADULT HEALTH CARE EXAMINATION: Primary | ICD-10-CM

## 2024-03-25 PROCEDURE — 99395 PREV VISIT EST AGE 18-39: CPT | Performed by: FAMILY MEDICINE

## 2024-03-25 PROCEDURE — 3008F BODY MASS INDEX DOCD: CPT | Performed by: FAMILY MEDICINE

## 2024-03-25 ASSESSMENT — ENCOUNTER SYMPTOMS
GASTROINTESTINAL NEGATIVE: 1
HEMATOLOGIC/LYMPHATIC NEGATIVE: 1
ALLERGIC/IMMUNOLOGIC NEGATIVE: 1
MUSCULOSKELETAL NEGATIVE: 1
PSYCHIATRIC NEGATIVE: 1
EYES NEGATIVE: 1
CARDIOVASCULAR NEGATIVE: 1
RESPIRATORY NEGATIVE: 1
ENDOCRINE NEGATIVE: 1
CONSTITUTIONAL NEGATIVE: 1
NEUROLOGICAL NEGATIVE: 1

## 2024-03-25 ASSESSMENT — PATIENT HEALTH QUESTIONNAIRE - PHQ9: SUM OF ALL RESPONSES TO PHQ9 QUESTIONS 1 & 2: 0

## 2024-03-25 ASSESSMENT — PAIN SCALES - GENERAL: PAINLEVEL: 0-NO PAIN

## 2024-03-25 NOTE — ASSESSMENT & PLAN NOTE
Overall there are no significant concerns today.  The patient pursues a healthy lifestyle, exercises frequently, watches diet carefully, and maintains a healthy weight.  We discussed disease and injury prevention including all pertinent screenings, vaccinations, and healthy lifestyle choices.  Blood work done recently was reviewed and looked very good.  He is eligible for the newest COVID booster.  I plan on seeing the patient back in a year for the next annual exam, sooner if necessary.

## 2024-03-25 NOTE — PROGRESS NOTES
"Subjective      Patient ID: Nixon Moscoso is a 38 y.o. male     HPI:  Annual exam.  Overall doing well.    The following have been reviewed and updated as appropriate in this visit:   Tobacco  Allergies  Meds  Problems  Med Hx  Surg Hx  Fam Hx       Review of Systems   Constitutional: Negative.    HENT: Negative.    Eyes: Negative.    Respiratory: Negative.    Cardiovascular: Negative.    Gastrointestinal: Negative.    Endocrine: Negative.    Genitourinary: Negative.    Musculoskeletal: Negative.    Skin: Negative.    Allergic/Immunologic: Negative.    Neurological: Negative.    Hematological: Negative.    Psychiatric/Behavioral: Negative.    All other systems reviewed and are negative.    Vitals:    03/25/24 0731   BP: 112/78   BP Location: Left upper arm   Patient Position: Sitting   Pulse: 78   Temp: 36.6 °C (97.8 °F)   TempSrc: Oral   SpO2: 99%   Weight: 92.5 kg (204 lb)   Height: 1.803 m (5' 11\")      No current outpatient medications on file.     No current facility-administered medications for this visit.      Social History     Tobacco Use   • Smoking status: Never   • Smokeless tobacco: Never   Substance Use Topics   • Alcohol use: Yes     Alcohol/week: 3.0 standard drinks of alcohol     Types: 3 Cans of beer per week     Comment: 4-5 weekly   • Drug use: No      Family History   Problem Relation Age of Onset   • Melanoma Biological Mother    • Hyperlipidemia Biological Father         Past Medical History:   Diagnosis Date   • Acne     Status post Accutane treatment   • Allergic rhinitis    • Exercise induced bronchospasm         Objective     Physical Exam  Vitals reviewed.   Constitutional:       Appearance: Normal appearance. He is normal weight.   HENT:      Right Ear: Tympanic membrane, ear canal and external ear normal. There is no impacted cerumen.      Left Ear: Tympanic membrane, ear canal and external ear normal. There is no impacted cerumen.   Eyes:      Conjunctiva/sclera: Conjunctivae " normal.   Cardiovascular:      Rate and Rhythm: Normal rate and regular rhythm.      Heart sounds: Normal heart sounds.   Pulmonary:      Effort: Pulmonary effort is normal.      Breath sounds: Normal breath sounds.   Abdominal:      Palpations: Abdomen is soft.   Musculoskeletal:      Cervical back: Neck supple.   Skin:     General: Skin is warm.   Neurological:      Mental Status: He is alert and oriented to person, place, and time.   Psychiatric:         Mood and Affect: Mood normal.         Problem List Items Addressed This Visit        Other    Encounter for preventative adult health care examination - Primary     Overall there are no significant concerns today.  The patient pursues a healthy lifestyle, exercises frequently, watches diet carefully, and maintains a healthy weight.  We discussed disease and injury prevention including all pertinent screenings, vaccinations, and healthy lifestyle choices.  Blood work done recently was reviewed and looked very good.  He is eligible for the newest COVID booster.  I plan on seeing the patient back in a year for the next annual exam, sooner if necessary.             Assessment/Plan

## 2025-03-25 SDOH — ECONOMIC STABILITY: FOOD INSECURITY: WITHIN THE PAST 12 MONTHS, YOU WORRIED THAT YOUR FOOD WOULD RUN OUT BEFORE YOU GOT MONEY TO BUY MORE.: NEVER TRUE

## 2025-03-25 SDOH — ECONOMIC STABILITY: INCOME INSECURITY: IN THE LAST 12 MONTHS, WAS THERE A TIME WHEN YOU WERE NOT ABLE TO PAY THE MORTGAGE OR RENT ON TIME?: NO

## 2025-03-25 SDOH — ECONOMIC STABILITY: FOOD INSECURITY: WITHIN THE PAST 12 MONTHS, THE FOOD YOU BOUGHT JUST DIDN'T LAST AND YOU DIDN'T HAVE MONEY TO GET MORE.: NEVER TRUE

## 2025-03-25 SDOH — ECONOMIC STABILITY: TRANSPORTATION INSECURITY
IN THE PAST 12 MONTHS, HAS LACK OF TRANSPORTATION KEPT YOU FROM MEETINGS, WORK, OR FROM GETTING THINGS NEEDED FOR DAILY LIVING?: NO

## 2025-03-25 SDOH — ECONOMIC STABILITY: TRANSPORTATION INSECURITY
IN THE PAST 12 MONTHS, HAS THE LACK OF TRANSPORTATION KEPT YOU FROM MEDICAL APPOINTMENTS OR FROM GETTING MEDICATIONS?: NO

## 2025-03-25 ASSESSMENT — SOCIAL DETERMINANTS OF HEALTH (SDOH): IN THE PAST 12 MONTHS, HAS THE ELECTRIC, GAS, OIL, OR WATER COMPANY THREATENED TO SHUT OFF SERVICE IN YOUR HOME?: NO

## 2025-03-26 ENCOUNTER — OFFICE VISIT (OUTPATIENT)
Dept: PRIMARY CARE | Facility: CLINIC | Age: 40
End: 2025-03-26
Payer: COMMERCIAL

## 2025-03-26 VITALS
OXYGEN SATURATION: 100 % | SYSTOLIC BLOOD PRESSURE: 116 MMHG | HEART RATE: 58 BPM | DIASTOLIC BLOOD PRESSURE: 78 MMHG | HEIGHT: 71 IN | BODY MASS INDEX: 26.88 KG/M2 | TEMPERATURE: 97.9 F | WEIGHT: 192 LBS

## 2025-03-26 DIAGNOSIS — E78.00 PURE HYPERCHOLESTEROLEMIA: ICD-10-CM

## 2025-03-26 DIAGNOSIS — I73.00 RAYNAUD DISEASE WITHOUT GANGRENE: ICD-10-CM

## 2025-03-26 DIAGNOSIS — Z00.00 ENCOUNTER FOR PREVENTATIVE ADULT HEALTH CARE EXAMINATION: Primary | ICD-10-CM

## 2025-03-26 PROCEDURE — 3008F BODY MASS INDEX DOCD: CPT | Performed by: FAMILY MEDICINE

## 2025-03-26 PROCEDURE — 99395 PREV VISIT EST AGE 18-39: CPT | Performed by: FAMILY MEDICINE

## 2025-03-26 ASSESSMENT — ENCOUNTER SYMPTOMS
PSYCHIATRIC NEGATIVE: 1
CONSTITUTIONAL NEGATIVE: 1
CARDIOVASCULAR NEGATIVE: 1
ENDOCRINE NEGATIVE: 1
NEUROLOGICAL NEGATIVE: 1
EYES NEGATIVE: 1
GASTROINTESTINAL NEGATIVE: 1
MUSCULOSKELETAL NEGATIVE: 1
HEMATOLOGIC/LYMPHATIC NEGATIVE: 1
RESPIRATORY NEGATIVE: 1
ALLERGIC/IMMUNOLOGIC NEGATIVE: 1

## 2025-03-26 ASSESSMENT — PAIN SCALES - GENERAL: PAINLEVEL_OUTOF10: 0-NO PAIN

## 2025-03-26 ASSESSMENT — PATIENT HEALTH QUESTIONNAIRE - PHQ9: SUM OF ALL RESPONSES TO PHQ9 QUESTIONS 1 & 2: 0

## 2025-03-26 NOTE — ASSESSMENT & PLAN NOTE
This was observed today in the office as his fingertips appeared white in color and after a few minutes of warming did return to their baseline color.  We discussed Raynaud's phenomenon and treatment of this.  We will monitor this moving forward.

## 2025-03-26 NOTE — ASSESSMENT & PLAN NOTE
Overall there are no significant concerns today.  The patient pursues a healthy lifestyle, exercises frequently, watches diet carefully, and maintains a healthy weight.  We discussed disease and injury prevention including all pertinent screenings, vaccinations, and healthy lifestyle choices.  He is eligible for the newest COVID booster and is advised that if he decides to get the annual flu shot to try to get it accomplished by the end of September every year.  I will order blood work and once available review and if there are any adverse findings I will be in touch.  I plan on seeing the patient back in a year for the next annual exam, sooner if necessary.

## 2025-03-26 NOTE — PROGRESS NOTES
"Subjective      Patient ID: Nixon Moscoso is a 39 y.o. male     HPI:  Annual exam.  Overall doing well.    The following have been reviewed and updated as appropriate in this visit:   Tobacco  Allergies  Meds  Problems  Med Hx  Surg Hx  Fam Hx       Review of Systems   Constitutional: Negative.    HENT: Negative.     Eyes: Negative.    Respiratory: Negative.     Cardiovascular: Negative.    Gastrointestinal: Negative.    Endocrine: Negative.    Genitourinary: Negative.    Musculoskeletal: Negative.    Skin: Negative.    Allergic/Immunologic: Negative.    Neurological: Negative.    Hematological: Negative.    Psychiatric/Behavioral: Negative.     All other systems reviewed and are negative.    Vitals:    03/26/25 0732   BP: 116/78   BP Location: Left upper arm   Patient Position: Sitting   Pulse: (!) 58   Temp: 36.6 °C (97.9 °F)   TempSrc: Temporal   SpO2: 100%   Weight: 87.1 kg (192 lb)   Height: 1.803 m (5' 11\")      No current outpatient medications on file.     No current facility-administered medications for this visit.      Social History     Tobacco Use    Smoking status: Never    Smokeless tobacco: Never   Substance Use Topics    Alcohol use: Yes     Alcohol/week: 3.0 standard drinks of alcohol     Types: 3 Cans of beer per week     Comment: 4-5 weekly    Drug use: No      Family History   Problem Relation Name Age of Onset    Melanoma Biological Mother      Hyperlipidemia Biological Father          Past Medical History:   Diagnosis Date    Acne     Status post Accutane treatment    Allergic rhinitis     Exercise induced bronchospasm         Objective     Physical Exam  Vitals reviewed.   Constitutional:       Appearance: Normal appearance. He is normal weight.   HENT:      Right Ear: Tympanic membrane, ear canal and external ear normal. There is no impacted cerumen.      Left Ear: Tympanic membrane, ear canal and external ear normal. There is no impacted cerumen.   Eyes:      Conjunctiva/sclera: " Conjunctivae normal.   Cardiovascular:      Rate and Rhythm: Normal rate and regular rhythm.      Heart sounds: Normal heart sounds.   Pulmonary:      Effort: Pulmonary effort is normal.      Breath sounds: Normal breath sounds.   Abdominal:      Palpations: Abdomen is soft.   Musculoskeletal:      Cervical back: Neck supple.   Lymphadenopathy:      Cervical: No cervical adenopathy.   Skin:     General: Skin is warm.      Findings: No erythema.      Comments: Fingers of both hands were observed to be white, but returned to normal color after warming   Neurological:      Mental Status: He is alert and oriented to person, place, and time.   Psychiatric:         Mood and Affect: Mood normal.         Problem List Items Addressed This Visit          Circulatory    Raynaud disease without gangrene    This was observed today in the office as his fingertips appeared white in color and after a few minutes of warming did return to their baseline color.  We discussed Raynaud's phenomenon and treatment of this.  We will monitor this moving forward.            Other    Pure hypercholesterolemia    Relevant Orders    Comprehensive metabolic panel    Lipid panel    Encounter for preventative adult health care examination - Primary    Overall there are no significant concerns today.  The patient pursues a healthy lifestyle, exercises frequently, watches diet carefully, and maintains a healthy weight.  We discussed disease and injury prevention including all pertinent screenings, vaccinations, and healthy lifestyle choices.  He is eligible for the newest COVID booster and is advised that if he decides to get the annual flu shot to try to get it accomplished by the end of September every year.  I will order blood work and once available review and if there are any adverse findings I will be in touch.  I plan on seeing the patient back in a year for the next annual exam, sooner if necessary.          Relevant Orders    Comprehensive  metabolic panel    Lipid panel       Assessment/Plan

## 2025-04-10 LAB
ALBUMIN SERPL-MCNC: 5.2 G/DL (ref 4.1–5.1)
ALP SERPL-CCNC: 69 IU/L (ref 44–121)
ALT SERPL-CCNC: 18 IU/L (ref 0–44)
AST SERPL-CCNC: 28 IU/L (ref 0–40)
BILIRUB SERPL-MCNC: 0.9 MG/DL (ref 0–1.2)
BUN SERPL-MCNC: 22 MG/DL (ref 6–20)
BUN/CREAT SERPL: 19 (ref 9–20)
CALCIUM SERPL-MCNC: 10.1 MG/DL (ref 8.7–10.2)
CHLORIDE SERPL-SCNC: 100 MMOL/L (ref 96–106)
CHOLEST SERPL-MCNC: 196 MG/DL (ref 100–199)
CO2 SERPL-SCNC: 22 MMOL/L (ref 20–29)
CREAT SERPL-MCNC: 1.14 MG/DL (ref 0.76–1.27)
EGFRCR SERPLBLD CKD-EPI 2021: 85 ML/MIN/1.73
GLOBULIN SER CALC-MCNC: 3.1 G/DL (ref 1.5–4.5)
GLUCOSE SERPL-MCNC: 86 MG/DL (ref 70–99)
HDLC SERPL-MCNC: 58 MG/DL
LDLC SERPL CALC-MCNC: 124 MG/DL (ref 0–99)
POTASSIUM SERPL-SCNC: 4.2 MMOL/L (ref 3.5–5.2)
PROT SERPL-MCNC: 8.3 G/DL (ref 6–8.5)
SODIUM SERPL-SCNC: 139 MMOL/L (ref 134–144)
TRIGL SERPL-MCNC: 78 MG/DL (ref 0–149)
VLDLC SERPL CALC-MCNC: 14 MG/DL (ref 5–40)